# Patient Record
Sex: FEMALE | Race: WHITE | Employment: OTHER | URBAN - METROPOLITAN AREA
[De-identification: names, ages, dates, MRNs, and addresses within clinical notes are randomized per-mention and may not be internally consistent; named-entity substitution may affect disease eponyms.]

---

## 2023-10-25 ENCOUNTER — OFFICE VISIT (OUTPATIENT)
Dept: ORTHOPEDIC SURGERY | Age: 63
End: 2023-10-25

## 2023-10-25 VITALS — HEIGHT: 64 IN | BODY MASS INDEX: 21.85 KG/M2 | WEIGHT: 128 LBS

## 2023-10-25 DIAGNOSIS — M54.2 NECK PAIN: ICD-10-CM

## 2023-10-25 DIAGNOSIS — M65.311 TRIGGER FINGER OF RIGHT THUMB: ICD-10-CM

## 2023-10-25 DIAGNOSIS — G89.29 CHRONIC LEFT SHOULDER PAIN: ICD-10-CM

## 2023-10-25 DIAGNOSIS — M54.12 CERVICAL RADICULOPATHY: Primary | ICD-10-CM

## 2023-10-25 DIAGNOSIS — M25.512 CHRONIC LEFT SHOULDER PAIN: ICD-10-CM

## 2023-10-25 DIAGNOSIS — M50.30 DDD (DEGENERATIVE DISC DISEASE), CERVICAL: ICD-10-CM

## 2023-10-25 DIAGNOSIS — M19.212 SECONDARY OSTEOARTHRITIS, LEFT SHOULDER: ICD-10-CM

## 2023-10-25 RX ORDER — LISINOPRIL 5 MG/1
TABLET ORAL
COMMUNITY
Start: 2023-09-04

## 2023-10-25 RX ORDER — ROSUVASTATIN CALCIUM 5 MG/1
5 TABLET, COATED ORAL EVERY MORNING
COMMUNITY
Start: 2023-09-02

## 2023-10-25 RX ORDER — ALPRAZOLAM 1 MG/1
1 TABLET ORAL 2 TIMES DAILY PRN
COMMUNITY
Start: 2023-10-09

## 2023-10-25 RX ORDER — METHYLPREDNISOLONE 4 MG/1
TABLET ORAL
Qty: 1 KIT | Refills: 0 | Status: SHIPPED | OUTPATIENT
Start: 2023-10-25

## 2023-10-25 RX ORDER — LOVASTATIN 10 MG/1
40 TABLET ORAL EVERY MORNING
COMMUNITY
Start: 2010-06-21

## 2023-10-25 RX ORDER — LEVOTHYROXINE SODIUM 112 MCG
TABLET ORAL
COMMUNITY
Start: 2023-10-15

## 2023-10-25 RX ORDER — VALACYCLOVIR HYDROCHLORIDE 1 G/1
TABLET, FILM COATED ORAL
COMMUNITY
Start: 2023-06-05

## 2023-10-26 NOTE — PROGRESS NOTES
A referral to PT has been ordered of the Cervical spine. A referral to Hand/Elbow specialist has been ordered. A referral to Sports medicine provider was entered.
does not have difficulty with rapid alternating hand movements. Strength testing in the upper extremity reveals the following based on the 5 point grading scale:     Delt(C5) Bicep(C6) WE(C6) Tricep (C7) WF(C7) (C8) Int (T1)   Right 5 5 5 5 5 5 5   Left 5 5 5 5 5 5 5     Pulses are palpable over bilateral radial arteries. Radiographic Studies:     Independent interpretation of AP lateral of the cervical spine reveals Multilevel facet arthropathy of the cervical spine sagittal view shows straightening of the normal lytic curve with slight anterior listhesis C2 4 5 and degenerative disc changes C5-6 and C6-7. X-ray impression: Advanced degenerative changes cervical spine. 3 views of the left shoulder reveal DJD of the left shoulder with prior shoulder trauma able to visualize previous x-rays of the shoulder when she had the hardware removed and does not significantly changed. X-ray impression: Advanced degenerative changes cervical spine next advanced degenerative changes left shoulder with prior history of trauma    Assessment/Plan:      ICD-10-CM    1. Cervical radiculopathy  M54.12 Amb External Referral To Physical Therapy      2. Neck pain  M54.2 XR CERVICAL SPINE (2-3 VIEWS)      3. Chronic left shoulder pain  M25.512 XR SHOULDER LEFT (MIN 2 VIEWS)    G89.29       4. Secondary osteoarthritis, left shoulder  M19.212 Ambulatory referral to Orthopedic Surgery      5. DDD (degenerative disc disease), cervical  M50.30 Amb External Referral To Physical Therapy      6. Trigger finger of right thumb  M65.311            She certainly has degenerative changes of the left shoulder secondary to her previous trauma and surgeries she has decreased range of motion and this is one underlying problem however I also feel she may have some cervical radiculopathy and degenerative changes of the cervical spine are quite prominent.   I would recommend referring her to Dr. Yessy Bhatia for further recommendations for his

## 2023-10-30 ENCOUNTER — OFFICE VISIT (OUTPATIENT)
Dept: ORTHOPEDIC SURGERY | Age: 63
End: 2023-10-30

## 2023-10-30 VITALS — HEIGHT: 64 IN | WEIGHT: 127 LBS | BODY MASS INDEX: 21.68 KG/M2

## 2023-10-30 DIAGNOSIS — M25.531 RIGHT WRIST PAIN: Primary | ICD-10-CM

## 2023-10-30 DIAGNOSIS — M65.311 TRIGGER FINGER OF RIGHT THUMB: ICD-10-CM

## 2023-10-30 RX ORDER — BETAMETHASONE SODIUM PHOSPHATE AND BETAMETHASONE ACETATE 3; 3 MG/ML; MG/ML
6 INJECTION, SUSPENSION INTRA-ARTICULAR; INTRALESIONAL; INTRAMUSCULAR; SOFT TISSUE ONCE
Status: COMPLETED | OUTPATIENT
Start: 2023-10-30 | End: 2023-10-30

## 2023-10-30 RX ADMIN — BETAMETHASONE SODIUM PHOSPHATE AND BETAMETHASONE ACETATE 6 MG: 3; 3 INJECTION, SUSPENSION INTRA-ARTICULAR; INTRALESIONAL; INTRAMUSCULAR; SOFT TISSUE at 10:02

## 2023-10-30 NOTE — PROGRESS NOTES
Orthopaedic Hand Surgery Note    Name: Darrel Stephens  YOB: 1960  Gender: female  MRN: [id number]    CC: New patient referred for hand pain    HPI: Patient is a 66 y.o. female with a chief complaint of right thumb clicking, locking and pain. The symptoms have been going on for 2 months, since lifting a heavy pot. She has recently been placed on a medrol dosepack for her neck, and this seems to have helped. .     ROS/Meds/PSH/PMH/FH/SH: I personally reviewed the patients standard intake form. Pertinents are discussed in the HPI    Physical Examination:  Musculoskeletal:   Examination on the right demonstrates Normal sensation to light touch in the median distribution, normal sensation in ulnar and radial distribution, positive tenderness of the thumb A1 pulley with palpable clicking and positive  locking. The extensor tendons all track well over the MCP joints. No tenderness at the [facility] joint, or first dorsal compartment. Finkelstein's is negative    Imaging / Electrodiagnostic Tests:     Wrist XR: AP, Lateral, Oblique views     Clinical Indication:  1. Right wrist pain    2. Trigger finger of right thumb           Report: AP, lateral, oblique x-ray of the right wrist demonstrates degenerative changes at the thumb IP joint    Impression: as above     Gabi Steen MD         Assessment:     ICD-10-CM    1. Right wrist pain  M25.531 XR WRIST RIGHT (MIN 3 VIEWS)      2. Trigger finger of right thumb  M65.311 betamethasone acetate-betamethasone sodium phosphate (CELESTONE) injection 6 mg     INJECT TENDON SHEATH/LIGAMENT          Plan:  We discussed the diagnosis and different treatment options. We discussed observation, splinting, cortisone injections and surgical release of the A1 pulley.  We discussed that stenosing tenosynovitis at the level of the A1 pulley AKA trigger finger is a chronic condition regardless of how long the symptoms have been present, this most likely has been progressing

## 2023-11-28 ENCOUNTER — OFFICE VISIT (OUTPATIENT)
Dept: ORTHOPEDIC SURGERY | Age: 63
End: 2023-11-28
Payer: COMMERCIAL

## 2023-11-28 VITALS — BODY MASS INDEX: 21.68 KG/M2 | HEIGHT: 64 IN | WEIGHT: 127 LBS

## 2023-11-28 DIAGNOSIS — S42.292P CLOSED 3-PART FRACTURE OF PROXIMAL HUMERUS WITH MALUNION, LEFT: ICD-10-CM

## 2023-11-28 DIAGNOSIS — M25.512 LEFT SHOULDER PAIN, UNSPECIFIED CHRONICITY: Primary | ICD-10-CM

## 2023-11-28 DIAGNOSIS — M19.012 OSTEOARTHRITIS OF LEFT AC (ACROMIOCLAVICULAR) JOINT: ICD-10-CM

## 2023-11-28 PROBLEM — R20.2 PARESTHESIA AND PAIN OF BOTH UPPER EXTREMITIES: Status: ACTIVE | Noted: 2023-08-08

## 2023-11-28 PROBLEM — K76.0 FATTY LIVER: Status: ACTIVE | Noted: 2021-08-17

## 2023-11-28 PROBLEM — M79.601 PARESTHESIA AND PAIN OF BOTH UPPER EXTREMITIES: Status: ACTIVE | Noted: 2023-08-08

## 2023-11-28 PROBLEM — M79.602 PARESTHESIA AND PAIN OF BOTH UPPER EXTREMITIES: Status: ACTIVE | Noted: 2023-08-08

## 2023-11-28 PROCEDURE — 99204 OFFICE O/P NEW MOD 45 MIN: CPT | Performed by: ORTHOPAEDIC SURGERY

## 2023-11-28 PROCEDURE — 20605 DRAIN/INJ JOINT/BURSA W/O US: CPT | Performed by: ORTHOPAEDIC SURGERY

## 2023-11-28 RX ORDER — METHYLPREDNISOLONE ACETATE 40 MG/ML
40 INJECTION, SUSPENSION INTRA-ARTICULAR; INTRALESIONAL; INTRAMUSCULAR; SOFT TISSUE ONCE
Status: COMPLETED | OUTPATIENT
Start: 2023-11-28 | End: 2023-11-28

## 2023-11-28 RX ADMIN — METHYLPREDNISOLONE ACETATE 40 MG: 40 INJECTION, SUSPENSION INTRA-ARTICULAR; INTRALESIONAL; INTRAMUSCULAR; SOFT TISSUE at 10:41

## 2023-11-28 NOTE — PROGRESS NOTES
Name: Sonam Wade  YOB: 1960  Gender: female  MRN: [id number]    CC:   Chief Complaint   Patient presents with    New Patient     Left shoulder    Left shoulder pain    HPI:  This patient presents with a several month history of Left shoulder pain. Patient previously saw Dr. Melina Stapleton and had a left shoulder ORIF in 2010 as well as hardware removal subsequently in 2010. The patient states that she feels pain began in August when she is moving a heavy potted plant the stairs. She notes most the pain is in the trap, neck and lateral shoulder. She has seen [facility] who sent her for physical therapy. She does complain of some numbness and tingling down the arm. She has been doing home exercise program and physical therapy. Denies popping and clicking in the shoulder but does note it in the neck. Allergies   Allergen Reactions    Penicillins Rash     As child       History reviewed. No pertinent past medical history. History reviewed. No pertinent surgical history. History reviewed. No pertinent family history.   Social History     Socioeconomic History    Marital status:      Spouse name: Not on file    Number of children: Not on file    Years of education: Not on file    Highest education level: Not on file   Occupational History    Not on file   Tobacco Use    Smoking status: Never    Smokeless tobacco: Never   Vaping Use    Vaping Use: Never used   Substance and Sexual Activity    Alcohol use: Yes    Drug use: Never    Sexual activity: Not on file   Other Topics Concern    Not on file   Social History Narrative    Not on file     Social Determinants of Health     Financial Resource Strain: Not on file   Food Insecurity: Not on file   Transportation Needs: Not on file   Physical Activity: Not on file   Stress: Not on file   Social Connections: Not on file   Intimate Partner Violence: Not on file   Housing Stability: Not on file              10/25/2023    11:25 AM   AMB PAIN

## 2023-12-07 ENCOUNTER — OFFICE VISIT (OUTPATIENT)
Dept: ORTHOPEDIC SURGERY | Age: 63
End: 2023-12-07
Payer: COMMERCIAL

## 2023-12-07 DIAGNOSIS — M50.30 DDD (DEGENERATIVE DISC DISEASE), CERVICAL: Primary | ICD-10-CM

## 2023-12-07 DIAGNOSIS — M54.12 CERVICAL RADICULOPATHY: ICD-10-CM

## 2023-12-07 PROCEDURE — 99213 OFFICE O/P EST LOW 20 MIN: CPT | Performed by: PHYSICIAN ASSISTANT

## 2023-12-07 NOTE — PROGRESS NOTES
Name: Denia Carlson  YOB: 1960  Gender: female  MRN: [id number]    Chief complaint: Neck Pain (Recheck after PT)       History of present illness: This is a very pleasant 66 y.o. old female who months ago was lifting and carrying a flower pot and felt a pop in the left side of her neck left shoulder even her right thumb. She has had trigger finger of the right thumb. She is also had pain in the left shoulder which has been quite severe has not been able to lift her arm up. She has had neck pain radiating to the top of the left shoulder prickly sensations down her left arm and burning into the left arm. She does have history of a left proximal humerus fracture had surgery with Dr. Dmitriy Brady years ago and then instrumentation was removed. There have not been associated changes in fine motor skills such as handwriting and buttoning buttons. There have not been changes in gait since the onset of the symptoms. The patient has not had cervical surgery in the past.      X-rays of her left shoulder showed prior trauma with mall union and degenerative changes. Cervical spine also showed degenerative disc changes. I felt she had a combination of shoulder pathology and cervical radiculopathy. We referred her to physical therapy. She also had a cortisone injection into the acromioclavicular joint with Dr. Priscilla Cho last week. She reports that significantly helped her shoulder pain. She has had improvement with her neck pain with physical therapy. She continues to do the exercises at home regularly. She has very rare tingling in the left arm on occasion but overall feels much better.              10/25/2023    11:25 AM   AMB PAIN ASSESSMENT   Location of Pain Neck    Location Modifiers Left   Severity of Pain 8   Quality of Pain Aching   Duration of Pain Persistent   Frequency of Pain Constant   Date Pain First Started 8/1/2023   Aggravating Factors Other (Comment)    Limiting Behavior Some

## 2024-01-04 ENCOUNTER — TELEPHONE (OUTPATIENT)
Dept: ORTHOPEDIC SURGERY | Age: 64
End: 2024-01-04

## 2024-01-04 NOTE — TELEPHONE ENCOUNTER
Left VM for pt asking how her shoulder was doing from injection. If doing okay we can cancel her apt and she can have injections every 3 months. And if it didn't help she can keep apt and we can discuss other options.

## 2024-01-09 ENCOUNTER — OFFICE VISIT (OUTPATIENT)
Dept: ORTHOPEDIC SURGERY | Age: 64
End: 2024-01-09
Payer: COMMERCIAL

## 2024-01-09 ENCOUNTER — TELEPHONE (OUTPATIENT)
Dept: ORTHOPEDIC SURGERY | Age: 64
End: 2024-01-09

## 2024-01-09 DIAGNOSIS — S42.292P CLOSED 3-PART FRACTURE OF PROXIMAL HUMERUS WITH MALUNION, LEFT: ICD-10-CM

## 2024-01-09 DIAGNOSIS — M54.2 NECK PAIN: ICD-10-CM

## 2024-01-09 DIAGNOSIS — R20.2 PARESTHESIA OF LEFT ARM: ICD-10-CM

## 2024-01-09 DIAGNOSIS — M19.012 OSTEOARTHRITIS OF LEFT AC (ACROMIOCLAVICULAR) JOINT: ICD-10-CM

## 2024-01-09 DIAGNOSIS — M25.512 LEFT SHOULDER PAIN, UNSPECIFIED CHRONICITY: Primary | ICD-10-CM

## 2024-01-09 PROCEDURE — 99214 OFFICE O/P EST MOD 30 MIN: CPT | Performed by: PHYSICIAN ASSISTANT

## 2024-01-09 RX ORDER — ALPRAZOLAM 0.5 MG/1
0.5 TABLET ORAL 3 TIMES DAILY PRN
Qty: 3 TABLET | Refills: 0 | Status: SHIPPED | OUTPATIENT
Start: 2024-01-09 | End: 2024-01-10

## 2024-01-09 NOTE — TELEPHONE ENCOUNTER
They need confirmation on the Xanax RX. It has two sets of directions on it. Please give her a call.

## 2024-01-09 NOTE — PROGRESS NOTES
Name: China Santos  YOB: 1960  Gender: female  MRN: [id number]    CC:   Chief Complaint   Patient presents with    Follow-up     Left shoulder        HPI: Patient presents for follow-up evaluation of left shoulder pain.  Patient received an AC injection on 11- and notes relief with injection.  Since the patient's last visit she also saw our spine team who decided to treat with observation.  They did see advanced degenerative changes in the cervical spine. Today the patient states she is having worsening numbness and tingling going down the arm.  Notes lateral and superior shoulder pain.  States she is unsure if it is more nerve related versus shoulder related.  Recall: several month history of Left shoulder pain.  Patient previously saw Dr. Caro and had a left shoulder ORIF in 2010 as well as hardware removal subsequently in 2010.  The patient states that she feels pain began in August when she is moving a heavy potted plant the stairs.  She notes most the pain is in the trap, neck and lateral shoulder.  She has seen Tina Emmanuel who sent her for physical therapy.  She does complain of some numbness and tingling down the arm.  She has been doing home exercise program and physical therapy.  Denies popping and clicking in the shoulder but does note it in the neck.   Allergies   Allergen Reactions    Penicillins Rash     As child       History reviewed. No pertinent past medical history.  History reviewed. No pertinent surgical history.  History reviewed. No pertinent family history.  Social History     Socioeconomic History    Marital status:      Spouse name: Not on file    Number of children: Not on file    Years of education: Not on file    Highest education level: Not on file   Occupational History    Not on file   Tobacco Use    Smoking status: Never    Smokeless tobacco: Never   Vaping Use    Vaping Use: Never used   Substance and Sexual Activity    Alcohol use: Yes    Drug

## 2024-01-24 ENCOUNTER — OFFICE VISIT (OUTPATIENT)
Dept: ORTHOPEDIC SURGERY | Age: 64
End: 2024-01-24
Payer: COMMERCIAL

## 2024-01-24 DIAGNOSIS — M50.30 DDD (DEGENERATIVE DISC DISEASE), CERVICAL: ICD-10-CM

## 2024-01-24 DIAGNOSIS — M48.02 FORAMINAL STENOSIS OF CERVICAL REGION: ICD-10-CM

## 2024-01-24 DIAGNOSIS — M54.2 CERVICALGIA: Primary | ICD-10-CM

## 2024-01-24 DIAGNOSIS — M48.02 CERVICAL SPINAL STENOSIS: ICD-10-CM

## 2024-01-24 PROCEDURE — 99214 OFFICE O/P EST MOD 30 MIN: CPT | Performed by: PHYSICIAN ASSISTANT

## 2024-01-24 NOTE — PROGRESS NOTES
Name: China Santos  YOB: 1960  Gender: female  MRN: [id number]    Chief complaint: Follow-up (MRI Results)       History of present illness:    This is a very pleasant 63 y.o. old female who months ago was lifting and carrying a flower pot and felt a pop in the left side of her neck left shoulder even her right thumb.  She has had trigger finger of the right thumb.  She is also had pain in the left shoulder which has been quite severe has not been able to lift her arm up.  She has had neck pain radiating to the top of the left shoulder prickly sensations down her left arm and burning into the left arm.  She does have history of a left proximal humerus fracture had surgery with Dr. Caro years ago and then instrumentation was removed.    There have not been associated changes in fine motor skills such as handwriting and buttoning buttons. There have not been changes in gait since the onset of the symptoms. The patient has not had cervical surgery in the past.      X-rays of her left shoulder showed prior trauma with malunion and degenerative changes.  Cervical spine also showed degenerative disc changes.  I felt she had a combination of shoulder pathology and cervical radiculopathy.  We referred her to physical therapy.  She also had a cortisone injection into the acromioclavicular joint with Dr. Smallwood which helped shoulder pain.  She has had improvement with her neck pain with physical therapy.  She continues to do the exercises at home regularly.  She has very rare tingling in the left arm on occasion but overall feels much better.    I felt she also had cervical radicular symptoms.              10/25/2023    11:25 AM   AMB PAIN ASSESSMENT   Location of Pain Neck    Location Modifiers Left   Severity of Pain 8   Quality of Pain Aching   Duration of Pain Persistent   Frequency of Pain Constant   Date Pain First Started 8/1/2023   Aggravating Factors Other (Comment)    Limiting Behavior

## 2024-01-24 NOTE — PATIENT INSTRUCTIONS
Patient Education        Learning About a Cervical Epidural Injection  What is a cervical epidural steroid injection?     A cervical epidural steroid injection is a shot of medicine into the area around the spinal cord in your neck. You may get it to help with pain, tingling, or numbness in your neck, shoulder, or arm. It may have a steroid to reduce swelling and pain and a local anesthetic to numb the nerves.  How is a cervical epidural steroid injection done?  The doctor will use a tiny needle to numb the skin where you are getting the injection.  After the skin is numb, your doctor will use a larger needle for the epidural injection. X-ray or ultrasound may be used to help guide the needle. You may feel some pressure. But you should not feel pain.  How long does an epidural steroid injection take?  The procedure will take 5 to 15 minutes. You will go home about an hour later.  What can you expect after a cervical epidural steroid injection?  If your injection included local anesthetic medicine, your neck, shoulder, arm, or hand may feel heavy or numb right after the shot.  With a local anesthetic, your pain may be gone right away. But it may return after a few hours. This is because the steroid hasn't started working yet. Before the steroid starts to work, your neck, shoulder, or arm may be sore for a few days.  These injections don't always work. When they do, it takes 1 to 5 days. The pain relief can last for several days to a few months or longer.  Some people are dizzy or feel sick to their stomach after getting this shot. These symptoms usually don't last very long.  You may want to do less than normal for a few days. But you may also be able to return to your daily routine.  If your pain is better, you may be able to keep doing your normal activities or physical therapy. But try not to overdo it, even if your pain has improved a lot. If your pain is only a little better or if it comes back, your doctor may

## 2024-03-05 ENCOUNTER — OFFICE VISIT (OUTPATIENT)
Dept: ORTHOPEDIC SURGERY | Age: 64
End: 2024-03-05
Payer: COMMERCIAL

## 2024-03-05 VITALS — BODY MASS INDEX: 21.68 KG/M2 | HEIGHT: 64 IN | WEIGHT: 127 LBS

## 2024-03-05 DIAGNOSIS — M65.311 TRIGGER FINGER OF RIGHT THUMB: Primary | ICD-10-CM

## 2024-03-05 PROCEDURE — 20550 NJX 1 TENDON SHEATH/LIGAMENT: CPT | Performed by: ORTHOPAEDIC SURGERY

## 2024-03-05 RX ORDER — BETAMETHASONE SODIUM PHOSPHATE AND BETAMETHASONE ACETATE 3; 3 MG/ML; MG/ML
6 INJECTION, SUSPENSION INTRA-ARTICULAR; INTRALESIONAL; INTRAMUSCULAR; SOFT TISSUE ONCE
Status: COMPLETED | OUTPATIENT
Start: 2024-03-05 | End: 2024-03-05

## 2024-03-05 RX ADMIN — BETAMETHASONE SODIUM PHOSPHATE AND BETAMETHASONE ACETATE 6 MG: 3; 3 INJECTION, SUSPENSION INTRA-ARTICULAR; INTRALESIONAL; INTRAMUSCULAR; SOFT TISSUE at 09:44

## 2024-03-05 NOTE — PROGRESS NOTES
name and birthdate. The injection site was identified, marked and prepped with a alcohol swab. Time out completed. The A1 pulley of the right thumb finger was/were injected with a 25 gauge needle with 1ml of 6mg/ml Betamethasone and 1ml xylocaine plain 1%. The injection site was then dressed with a bandaid. The patient tolerated the injection well. The patient was instructed to monitor their blood sugars if diabetic and call if any concerns. The patient was instructed to call the office if any adverse local effects occurred or any if any questions or concerns arise.       Patient voiced accordance and understanding of the information provided and the formulated plan. All questions were answered to the patient's satisfaction during the encounter.        Jessica Huerta MD  Orthopaedic Surgery  03/05/24  9:41 AM

## 2024-03-27 ENCOUNTER — OFFICE VISIT (OUTPATIENT)
Dept: ORTHOPEDIC SURGERY | Age: 64
End: 2024-03-27
Payer: COMMERCIAL

## 2024-03-27 DIAGNOSIS — M48.02 CERVICAL SPINAL STENOSIS: ICD-10-CM

## 2024-03-27 DIAGNOSIS — M48.02 FORAMINAL STENOSIS OF CERVICAL REGION: ICD-10-CM

## 2024-03-27 DIAGNOSIS — M50.30 DDD (DEGENERATIVE DISC DISEASE), CERVICAL: Primary | ICD-10-CM

## 2024-03-27 PROCEDURE — 99214 OFFICE O/P EST MOD 30 MIN: CPT | Performed by: PHYSICIAN ASSISTANT

## 2024-03-27 NOTE — PROGRESS NOTES
spurring. Moderate  canal narrowing. Severe left foraminal narrowing. Mild flattening of the  cervical cord.      C5-6: Disc osteophyte complex formation. Moderate canal narrowing. Moderate  bilateral foraminal narrowing. Flattening of the cervical cord.      C6-7:  Disc desiccation. Mild posterior disc protrusion. Severe right foraminal  narrowing.        C7-T1: Disc desiccation and severe right foraminal narrowing.    Impression  Severe multilevel degenerative changes of the cervical spine seen  most significantly at C4/C5.    I have independently reviewedThe MRI of the cervical spine.  She does have degenerative changes C4-5, C5-6 and C6-7 with disc bulging.  There is mild left foraminal narrowing C3-4.  There is severe left foraminal narrowing C4-5 and severe right foraminal narrowing C5-6.  No significant central stenosis.  Cord signal is normal.    Assessment/Plan:      ICD-10-CM    1. DDD (degenerative disc disease), cervical  M50.30       2. Foraminal stenosis of cervical region  M48.02       3. Cervical spinal stenosis  M48.02          She has improvement of her cervical radicular symptoms with FAINA although not resolved. She would like to avoid surgery. I recommend trying another FAINA to see if her numnbess and tingling continue to improve. She does not have weakness and pain is better. We also discussed surgical options.      -Injections:  The patient will be referred to a colleague who specializes in cervical epidural and or facet injections. We will reassess for potential surgical options if there is no long lasting benefit from the injections.  -Future surgery: If the patient fails the conservative options listed above, I believe they may be a candidate for a likely C4-5 ACDF, possibly C5-6 as well.        No orders of the defined types were placed in this encounter.       No orders of the defined types were placed in this encounter.       4 This is a chronic illness/condition with exacerbation and

## 2024-05-16 ENCOUNTER — OFFICE VISIT (OUTPATIENT)
Age: 64
End: 2024-05-16
Payer: COMMERCIAL

## 2024-05-16 DIAGNOSIS — M48.02 CERVICAL SPINAL STENOSIS: ICD-10-CM

## 2024-05-16 DIAGNOSIS — M48.02 FORAMINAL STENOSIS OF CERVICAL REGION: Primary | ICD-10-CM

## 2024-05-16 DIAGNOSIS — M50.30 DDD (DEGENERATIVE DISC DISEASE), CERVICAL: ICD-10-CM

## 2024-05-16 DIAGNOSIS — M19.012 OSTEOARTHRITIS OF LEFT AC (ACROMIOCLAVICULAR) JOINT: ICD-10-CM

## 2024-05-16 DIAGNOSIS — M25.512 LEFT SHOULDER PAIN, UNSPECIFIED CHRONICITY: ICD-10-CM

## 2024-05-16 DIAGNOSIS — S42.292P CLOSED 3-PART FRACTURE OF PROXIMAL HUMERUS WITH MALUNION, LEFT: ICD-10-CM

## 2024-05-16 DIAGNOSIS — M54.12 CERVICAL RADICULOPATHY: ICD-10-CM

## 2024-05-16 PROCEDURE — 99214 OFFICE O/P EST MOD 30 MIN: CPT | Performed by: PHYSICIAN ASSISTANT

## 2024-05-16 RX ORDER — CELECOXIB 200 MG/1
200 CAPSULE ORAL DAILY
Qty: 30 CAPSULE | Refills: 0 | Status: SHIPPED | OUTPATIENT
Start: 2024-05-16 | End: 2024-06-15

## 2024-05-16 RX ORDER — GABAPENTIN 100 MG/1
100 CAPSULE ORAL 3 TIMES DAILY
Qty: 90 CAPSULE | Refills: 0 | Status: SHIPPED | OUTPATIENT
Start: 2024-05-16 | End: 2024-06-15

## 2024-05-16 NOTE — PROGRESS NOTES
A referral to PT has been ordered of the Cervical spine. A referral to pain management has been ordered. [facility] A referral to surgical spine has been placed.

## 2024-05-16 NOTE — PROGRESS NOTES
Name: China Santos  YOB: 1960  Gender: female  MRN: [id number]    Chief complaint: Neck Pain (Follow up after Cervical injection)       History of present illness:    This is a very pleasant 64 y.o. old female who August 2023 was lifting and carrying a flower pot and felt a pop in the left side of her neck left shoulder even her right thumb.  She has had trigger finger of the right thumb which recently was injected by Dr. Huerta.  She also had pain in the left shoulder and was not able to lift her arm up.  She has had neck pain radiating to the top of the left shoulder prickly sensations down her left arm and burning into the left arm.  She does have history of a left proximal humerus fracture had ORIF with a malunion.    X-rays of her left shoulder showed prior trauma with malunion and degenerative changes.  Cervical spine also showed degenerative disc changes.  I felt she had a combination of shoulder pathology and cervical radiculopathy.  We referred her to physical therapy.  She also had a cortisone injection into the acromioclavicular joint with Dr. Smallwood which helped shoulder pain.      MRI of the cervical spine revealed degenerative changes C4-5, C5-6 and C6-7 with disc bulging.  There is mild left foraminal narrowing C3-4.  There is severe left foraminal narrowing C4-5 and severe right foraminal narrowing C5-6.  No significant central stenosis.  Cord signal is normal.   We felt she had cervical radicular symptoms and recommended cervical MONIK. She had FAINA 2/28/24 and reports the MONIK was very helpful with her radicular symptoms in the left arm.. She still had some shoulder pain and tingling in her left arm.   She had a repeat cervical MONIK with Dr. Hou and she has had resolution of paresthesias and tingling in her left arm.  Since that injection however she is having worsening burning pain over the left shoulder and deltoid.  Pain is now 8 out of 10.  She has not followed up with

## 2024-05-31 ENCOUNTER — EVALUATION (OUTPATIENT)
Age: 64
End: 2024-05-31

## 2024-05-31 DIAGNOSIS — M54.2 NECK PAIN ON LEFT SIDE: ICD-10-CM

## 2024-05-31 DIAGNOSIS — M54.12 CERVICAL RADICULOPATHY: ICD-10-CM

## 2024-05-31 DIAGNOSIS — M50.30 DDD (DEGENERATIVE DISC DISEASE), CERVICAL: ICD-10-CM

## 2024-05-31 DIAGNOSIS — M19.012 OSTEOARTHRITIS OF LEFT AC (ACROMIOCLAVICULAR) JOINT: ICD-10-CM

## 2024-05-31 DIAGNOSIS — M48.02 FORAMINAL STENOSIS OF CERVICAL REGION: ICD-10-CM

## 2024-05-31 DIAGNOSIS — R68.89 DECREASED ACTIVITY TOLERANCE: ICD-10-CM

## 2024-05-31 DIAGNOSIS — S42.292P CLOSED 3-PART FRACTURE OF PROXIMAL HUMERUS WITH MALUNION, LEFT: ICD-10-CM

## 2024-05-31 DIAGNOSIS — M48.02 CERVICAL SPINAL STENOSIS: ICD-10-CM

## 2024-05-31 DIAGNOSIS — M25.612 SHOULDER STIFFNESS, LEFT: Primary | ICD-10-CM

## 2024-05-31 DIAGNOSIS — M25.512 CHRONIC LEFT SHOULDER PAIN: ICD-10-CM

## 2024-05-31 DIAGNOSIS — G89.29 CHRONIC LEFT SHOULDER PAIN: ICD-10-CM

## 2024-05-31 DIAGNOSIS — M25.512 LEFT SHOULDER PAIN, UNSPECIFIED CHRONICITY: ICD-10-CM

## 2024-05-31 DIAGNOSIS — Z78.9 DECREASED ACTIVITIES OF DAILY LIVING (ADL): ICD-10-CM

## 2024-05-31 DIAGNOSIS — M62.81 MUSCLE WEAKNESS (GENERALIZED): ICD-10-CM

## 2024-05-31 NOTE — PROGRESS NOTES
improving pain levels and muscle activation.    Long term goals to be met by 8/29/2024 (90 days):  Pt will be independent and compliant with a comprehensive home program and activity progression.  Pt will achieve cervical AROM to WFL to improve safety when driving, comfort while reading, and sleep.  Pt will demo MMT >/= 4/5 in C paraspinals to improve ADL performance.  Pt will be able to sleep without limitation due to neck pain.  Pt will return to previous level of function without complaint of neck pain.    [employer]

## 2024-06-05 ENCOUNTER — TREATMENT (OUTPATIENT)
Age: 64
End: 2024-06-05
Payer: COMMERCIAL

## 2024-06-05 DIAGNOSIS — M54.2 NECK PAIN ON LEFT SIDE: ICD-10-CM

## 2024-06-05 DIAGNOSIS — Z78.9 DECREASED ACTIVITIES OF DAILY LIVING (ADL): ICD-10-CM

## 2024-06-05 DIAGNOSIS — M25.512 CHRONIC LEFT SHOULDER PAIN: ICD-10-CM

## 2024-06-05 DIAGNOSIS — R68.89 DECREASED ACTIVITY TOLERANCE: ICD-10-CM

## 2024-06-05 DIAGNOSIS — G89.29 CHRONIC LEFT SHOULDER PAIN: ICD-10-CM

## 2024-06-05 DIAGNOSIS — M54.12 CERVICAL RADICULOPATHY: ICD-10-CM

## 2024-06-05 DIAGNOSIS — M48.02 CERVICAL SPINAL STENOSIS: ICD-10-CM

## 2024-06-05 DIAGNOSIS — M25.612 SHOULDER STIFFNESS, LEFT: Primary | ICD-10-CM

## 2024-06-05 PROCEDURE — 97110 THERAPEUTIC EXERCISES: CPT | Performed by: PHYSICAL THERAPIST

## 2024-06-05 PROCEDURE — 97012 MECHANICAL TRACTION THERAPY: CPT | Performed by: PHYSICAL THERAPIST

## 2024-06-05 PROCEDURE — 97140 MANUAL THERAPY 1/> REGIONS: CPT | Performed by: PHYSICAL THERAPIST

## 2024-06-05 NOTE — PROGRESS NOTES
[facility]  [address] [place] [id number]  Dept: [phone]      Physical Therapy Daily Note     Referring MD: Tina Emmanuel PA-C  Diagnosis:   No diagnosis found.     Surgery: L shoulder surgery    Therapy precautions: X-rays of her left shoulder showed prior trauma with malunion and degenerative changes.   Co-morbidities affecting plan of care: none    Payor: Payor: [employer] /  /  /  Billing pattern: Commercial- substantial/midpoint time each CPT  Total Timed Procedure Codes: 45 min, Total Time: 60 min Modifier needed: No  Episode visit count:  2     Diagnostic exams (per chart review): MRI of the cervical spine revealed degenerative changes C4-5, C5-6 and C6-7 with disc bulging.  There is mild left foraminal narrowing C3-4.  There is severe left foraminal narrowing C4-5 and severe right foraminal narrowing C5-6.  No significant central stenosis.  Cord signal is normal.     MEDICATION: reviewed in chart  SUBJECTIVE     Reports sleep was as bad for at least 2 nights.     OBJECTIVE   Patient with improved tolerance to manual therapy. Pain was centralized with SB R, Rotation R.     Treatment provided today consisted of:     Therapeutic exercise (58034) x 10 min to address ROM/strength deficits and to develop an initial HEP as noted below.    Education on posture and symptom management  Head press in supine in flexion SB L, rotation L  Standing overhead stretches  Supine scap retractions and protractions with UE in front x 10   Supine horizontal abd/add with slight head press the whole movement     Manual therapy (88659) x 30 min utilizing techniques to improve joint and/or soft tissue mobility, ROM, and function as well as helping to decrease pain/spasms and swelling.  Palpation and assessment of soft tissue, muscles, and landmarks   Grade 1 mob to mid C's into flexion SB  SCS technique to PC 2-5, inion  STM to TRP, levators    Intermittent mechanical traction (27212) x 15 min

## 2024-06-10 ENCOUNTER — OFFICE VISIT (OUTPATIENT)
Age: 64
End: 2024-06-10
Payer: COMMERCIAL

## 2024-06-10 DIAGNOSIS — M54.12 CERVICAL RADICULOPATHY: ICD-10-CM

## 2024-06-10 DIAGNOSIS — M48.02 CERVICAL SPINAL STENOSIS: Primary | ICD-10-CM

## 2024-06-10 PROCEDURE — 99214 OFFICE O/P EST MOD 30 MIN: CPT | Performed by: ORTHOPAEDIC SURGERY

## 2024-06-10 RX ORDER — GABAPENTIN 100 MG/1
100 CAPSULE ORAL 3 TIMES DAILY
Qty: 90 CAPSULE | Refills: 0 | Status: SHIPPED | OUTPATIENT
Start: 2024-06-10 | End: 2024-07-10

## 2024-06-10 NOTE — PROGRESS NOTES
Name: China Santos  YOB: 1960  Gender: female  MRN: [id number]  Age: 64 y.o.    Chief Complaint: Neck and radiating upper extremity pain.    History of present illness:    This is a very pleasant 64 y.o. female who has a remote history of fracture of the left humeral head.  She has always had some stiffness of the left shoulder.  However, her left shoulder pain became much worse after an incident carrying a flowerpot last summer.  The pain radiated from her neck and into the left shoulder even down to the thumb.  She developed a trigger thumb on the right side which was injected by Dr. Huerta.  She had significant weakness and difficulty raising her left arm at the deltoid.  She had burning sensations over the deltoid to the mid biceps area.  She had significant neck pain with turning her head to the left side or looking overhead.  She did see Dr. Smallwood for the shoulder who recommended physical therapy and performed an injection in the shoulder but he ultimately felt that the symptoms were more neurologic.  An MRI of the cervical spine was obtained.  She had a cervical epidural steroid injection which helped transiently with her radicular symptoms.  Her pain is quite nagging and interferes with activities of daily living including housework, gardening, travel, sleeping.  She has pain even at sitting rest.            Medications:   Current Outpatient Medications   Medication Sig    celecoxib (CELEBREX) 200 MG capsule Take 1 capsule by mouth daily    gabapentin (NEURONTIN) 100 MG capsule Take 1 capsule by mouth 3 times daily for 30 days. Take 1 tablet by mouth at night for 3 nights, then increase to BID morning and night for 3 days, you may taper up to three times a day.    ALPRAZolam (XANAX) 1 MG tablet Take 1 tablet by mouth 2 times daily as needed.    SYNTHROID 112 MCG tablet     lisinopril (PRINIVIL;ZESTRIL) 5 MG tablet TAKE 1 TABLET BY MOUTH IN THE MORNING AND 1 IN THE EVENING

## 2024-06-11 ENCOUNTER — PREP FOR PROCEDURE (OUTPATIENT)
Dept: ORTHOPEDIC SURGERY | Age: 64
End: 2024-06-11

## 2024-06-11 DIAGNOSIS — M54.12 CERVICAL RADICULOPATHY: ICD-10-CM

## 2024-06-11 DIAGNOSIS — M48.02 CERVICAL SPINAL STENOSIS: Primary | ICD-10-CM

## 2024-06-11 DIAGNOSIS — M40.202 KYPHOSIS OF CERVICAL REGION, UNSPECIFIED KYPHOSIS TYPE: ICD-10-CM

## 2024-06-11 RX ORDER — ACETAMINOPHEN 325 MG/1
1000 TABLET ORAL ONCE
Status: CANCELLED | OUTPATIENT
Start: 2024-06-11 | End: 2024-06-11

## 2024-06-18 ENCOUNTER — HOSPITAL ENCOUNTER (OUTPATIENT)
Dept: SURGERY | Age: 64
Discharge: HOME | End: 2024-06-21
Payer: COMMERCIAL

## 2024-06-18 VITALS
WEIGHT: 124.8 LBS | SYSTOLIC BLOOD PRESSURE: 127 MMHG | BODY MASS INDEX: 22.97 KG/M2 | TEMPERATURE: 97.1 F | HEART RATE: 94 BPM | HEIGHT: 62 IN | RESPIRATION RATE: 18 BRPM | OXYGEN SATURATION: 96 % | DIASTOLIC BLOOD PRESSURE: 68 MMHG

## 2024-06-18 LAB
ANION GAP SERPL CALC-SCNC: 15 MMOL/L (ref 9–18)
APPEARANCE UR: CLEAR
BACTERIA URNS QL MICRO: NEGATIVE /HPF
BASOPHILS # BLD: 0.1 K/UL (ref 0–0.2)
BASOPHILS NFR BLD: 1 % (ref 0–2)
BILIRUB UR QL: NEGATIVE
BUN SERPL-MCNC: 13 MG/DL (ref 8–23)
CALCIUM SERPL-MCNC: 10.1 MG/DL (ref 8.8–10.2)
CASTS URNS QL MICRO: ABNORMAL /LPF
CHLORIDE SERPL-SCNC: 101 MMOL/L (ref 98–107)
CO2 SERPL-SCNC: 23 MMOL/L (ref 20–28)
COLOR UR: ABNORMAL
CREAT SERPL-MCNC: 0.42 MG/DL (ref 0.6–1.1)
DIFFERENTIAL METHOD BLD: ABNORMAL
EOSINOPHIL # BLD: 0.1 K/UL (ref 0–0.8)
EOSINOPHIL NFR BLD: 0 % (ref 0.5–7.8)
EPI CELLS #/AREA URNS HPF: ABNORMAL /HPF
ERYTHROCYTE [DISTWIDTH] IN BLOOD BY AUTOMATED COUNT: 12.1 % (ref 11.9–14.6)
GLUCOSE SERPL-MCNC: 100 MG/DL (ref 70–99)
GLUCOSE UR STRIP.AUTO-MCNC: NEGATIVE MG/DL
HCT VFR BLD AUTO: 41.8 % (ref 35.8–46.3)
HGB BLD-MCNC: 13.7 G/DL (ref 11.7–15.4)
HGB UR QL STRIP: NEGATIVE
HYALINE CASTS URNS QL MICRO: ABNORMAL /LPF
IMM GRANULOCYTES # BLD AUTO: 0.1 K/UL (ref 0–0.5)
IMM GRANULOCYTES NFR BLD AUTO: 0 % (ref 0–5)
KETONES UR QL STRIP.AUTO: NEGATIVE MG/DL
LEUKOCYTE ESTERASE UR QL STRIP.AUTO: ABNORMAL
LYMPHOCYTES # BLD: 2.7 K/UL (ref 0.5–4.6)
LYMPHOCYTES NFR BLD: 24 % (ref 13–44)
MCH RBC QN AUTO: 31.8 PG (ref 26.1–32.9)
MCHC RBC AUTO-ENTMCNC: 32.8 G/DL (ref 31.4–35)
MCV RBC AUTO: 97 FL (ref 82–102)
MONOCYTES # BLD: 1 K/UL (ref 0.1–1.3)
MONOCYTES NFR BLD: 9 % (ref 4–12)
NEUTS SEG # BLD: 7.3 K/UL (ref 1.7–8.2)
NEUTS SEG NFR BLD: 66 % (ref 43–78)
NITRITE UR QL STRIP.AUTO: NEGATIVE
NRBC # BLD: 0 K/UL (ref 0–0.2)
PH UR STRIP: 7.5 (ref 5–9)
PLATELET # BLD AUTO: 326 K/UL (ref 150–450)
PMV BLD AUTO: 10 FL (ref 9.4–12.3)
POTASSIUM SERPL-SCNC: 4.1 MMOL/L (ref 3.5–5.1)
PROT UR STRIP-MCNC: NEGATIVE MG/DL
RBC # BLD AUTO: 4.31 M/UL (ref 4.05–5.2)
RBC #/AREA URNS HPF: ABNORMAL /HPF
SODIUM SERPL-SCNC: 139 MMOL/L (ref 136–145)
SP GR UR REFRACTOMETRY: 1 (ref 1–1.02)
UROBILINOGEN UR QL STRIP.AUTO: 0.2 EU/DL (ref 0.2–1)
WBC # BLD AUTO: 11.2 K/UL (ref 4.3–11.1)
WBC URNS QL MICRO: ABNORMAL /HPF

## 2024-06-18 PROCEDURE — 80048 BASIC METABOLIC PNL TOTAL CA: CPT

## 2024-06-18 PROCEDURE — 81001 URINALYSIS AUTO W/SCOPE: CPT

## 2024-06-18 PROCEDURE — 87641 MR-STAPH DNA AMP PROBE: CPT

## 2024-06-18 PROCEDURE — 85025 COMPLETE CBC W/AUTO DIFF WBC: CPT

## 2024-06-18 RX ORDER — VITAMIN B COMPLEX
1 CAPSULE ORAL DAILY
COMMUNITY

## 2024-06-18 RX ORDER — ANTIOX #8/OM3/DHA/EPA/LUT/ZEAX 250-2.5 MG
1 CAPSULE ORAL 2 TIMES DAILY
COMMUNITY

## 2024-06-18 ASSESSMENT — PAIN DESCRIPTION - LOCATION: LOCATION: BACK

## 2024-06-18 ASSESSMENT — PAIN DESCRIPTION - PAIN TYPE: TYPE: CHRONIC PAIN

## 2024-06-18 ASSESSMENT — PAIN DESCRIPTION - FREQUENCY: FREQUENCY: INTERMITTENT

## 2024-06-18 ASSESSMENT — PAIN DESCRIPTION - DESCRIPTORS: DESCRIPTORS: ACHING;SHARP;BURNING

## 2024-06-18 ASSESSMENT — PAIN DESCRIPTION - DIRECTION: RADIATING_TOWARDS: SHOULDER

## 2024-06-18 NOTE — PRE-PROCEDURE INSTRUCTIONS
Patient verified name, , and surgery as listed in [facility]. Patient provided medical/health information and PTA medications to the best of their ability.    TYPE  CASE: 2  Orders per surgeon: were received per protocol  Labs per surgeon: cbc w/ diff, bmp ua and mrsa collected and results in EPIC  Labs per anesthesia protocol: no additional  EKG:  not required per protocol. Patient denies any hx of CAD     MRSA/MSSA swab collected; pharmacy to review and dose antibiotic as appropriate.     Patient provided with and instructed on education handouts including Guide to Surgery, blood transfusions, pain management, and hand hygiene for the family and community, and [facility] brochure.     Road to Recovery Spine surgery patient guide given.   Samia-hex and instructions given per hospital policy.    Original medication prescription bottles not visualized during patient appointment.     Patient teach back successful and patient demonstrates knowledge of instruction.

## 2024-06-18 NOTE — PRE-PROCEDURE INSTRUCTIONS
PLEASE CONTINUE TAKING ALL PRESCRIPTION MEDICATIONS UP TO THE DAY OF SURGERY UNLESS OTHERWISE DIRECTED BELOW. You may take Tylenol, allergy,  and/or indigestion medications.     TAKE ONLY THESE MEDICATIONS ON THE DAY OF SURGERY    GABAPENTIN   XANAX IF NEEDED   SYNTHROID     DISCONTINUE all vitamins and supplements 7 days prior to surgery. DISCONTINUE Non-Steroidal Anti-Inflammatory (NSAIDS) such as Advil and Aleve 5 days prior to surgery.     Home Medications to Hold- please continue all other medications except these.    HOLD LISINOPRIL MORNING OF SURGERY   HOLD ALL VITAMINS AND SUPPLEMENTS 7 DAYS PRIOR TO SURGERY     Comments      On the day before surgery please take 2 Tylenol in the morning and then again before bed. You may use either regular or extra strength. 06/25/2024          Please do not bring home medications with you on the day of surgery unless otherwise directed by your nurse.  If you are instructed to bring home medications, please give them to your nurse as they will be administered by the nursing staff.    If you have any questions, please call [facility] [phone].    A copy of this note was provided to the patient for reference.

## 2024-06-18 NOTE — PROGRESS NOTES
Pre-Assessment Surgery Review      Patient ID:  China Santos  [id number]  64 y.o.  1960  Surgeon: Dr Rosa  Date of Surgery: 6/26/2024  Procedure:  C4-6 anterior cervical discectomy and fusion    Primary Care Physician: Maritza Shine [phone]  Specialty Physician(s):      Subjective:   China Santos is a 64 y.o. White (non-) female who presents for preoperative evaluation. This is a preoperative chart review note based on data collected by the nurse at the surgical Pre-Assessment visit.    Past Medical History:   Diagnosis Date    Hyperlipidemia     Hypertension     Thyroid disease       Past Surgical History:   Procedure Laterality Date    CHOLECYSTECTOMY, LAPAROSCOPIC  2011    HYSTERECTOMY (CERVIX STATUS UNKNOWN)  2011    Endometeriosis    ORIF HUMERUS FRACTURE  2010    with rai inserted     No family history on file.   Social History     Tobacco Use    Smoking status: Never    Smokeless tobacco: Never   Substance Use Topics    Alcohol use: Yes       Prior to Admission medications    Medication Sig Start Date End Date Taking? Authorizing Provider   b complex vitamins capsule Take 1 capsule by mouth daily   Yes Parvin Swanson MD   Omega-3 Fatty Acids (FISH OIL) 1200 MG CPDR Take 2 capsules by mouth Daily   Yes Parvin Swanson MD   Multiple Vitamins-Minerals (PRESERVISION AREDS 2) CAPS Take 1 capsule by mouth in the morning and at bedtime   Yes Parvin Swanson MD   gabapentin (NEURONTIN) 100 MG capsule Take 1 capsule by mouth 3 times daily for 30 days. Intended supply: 30 days 6/10/24 7/10/24  Danny Rosa MD   ALPRAZolam (XANAX) 1 MG tablet Take 1 tablet by mouth 2 times daily as needed. 10/9/23   Parvin Swanson MD   SYNTHROID 112 MCG tablet  10/15/23   Parvin Swanson MD   lisinopril (PRINIVIL;ZESTRIL) 5 MG tablet Take 1 tablet by mouth daily May take an additional dose if BP rises 9/4/23   Parvin Swanson MD   mupirocin

## 2024-06-19 LAB
MRSA DNA SPEC QL NAA+PROBE: NOT DETECTED
S AUREUS CPE NOSE QL NAA+PROBE: NOT DETECTED

## 2024-06-25 ENCOUNTER — ANESTHESIA EVENT (OUTPATIENT)
Dept: SURGERY | Age: 64
End: 2024-06-25
Payer: COMMERCIAL

## 2024-06-26 ENCOUNTER — APPOINTMENT (OUTPATIENT)
Dept: GENERAL RADIOLOGY | Age: 64
End: 2024-06-26
Attending: ORTHOPAEDIC SURGERY
Payer: COMMERCIAL

## 2024-06-26 ENCOUNTER — HOSPITAL ENCOUNTER (OUTPATIENT)
Age: 64
Setting detail: OUTPATIENT SURGERY
Discharge: HOME | End: 2024-06-26
Attending: ORTHOPAEDIC SURGERY | Admitting: ORTHOPAEDIC SURGERY
Payer: COMMERCIAL

## 2024-06-26 ENCOUNTER — ANESTHESIA (OUTPATIENT)
Dept: SURGERY | Age: 64
End: 2024-06-26
Payer: COMMERCIAL

## 2024-06-26 VITALS
DIASTOLIC BLOOD PRESSURE: 92 MMHG | SYSTOLIC BLOOD PRESSURE: 155 MMHG | RESPIRATION RATE: 17 BRPM | BODY MASS INDEX: 22.68 KG/M2 | OXYGEN SATURATION: 93 % | WEIGHT: 124 LBS | TEMPERATURE: 98.2 F | HEART RATE: 70 BPM

## 2024-06-26 DIAGNOSIS — M54.12 CERVICAL RADICULOPATHY: Primary | ICD-10-CM

## 2024-06-26 LAB
ABO + RH BLD: NORMAL
BLOOD GROUP ANTIBODIES SERPL: NORMAL
SPECIMEN EXP DATE BLD: NORMAL

## 2024-06-26 PROCEDURE — 6360000002 HC RX W HCPCS: Performed by: ORTHOPAEDIC SURGERY

## 2024-06-26 PROCEDURE — 2500000003 HC RX 250 WO HCPCS

## 2024-06-26 PROCEDURE — 2709999900 HC NON-CHARGEABLE SUPPLY: Performed by: ORTHOPAEDIC SURGERY

## 2024-06-26 PROCEDURE — 3600000004 HC SURGERY LEVEL 4 BASE: Performed by: ORTHOPAEDIC SURGERY

## 2024-06-26 PROCEDURE — 2580000003 HC RX 258: Performed by: ANESTHESIOLOGY

## 2024-06-26 PROCEDURE — 3600000014 HC SURGERY LEVEL 4 ADDTL 15MIN: Performed by: ORTHOPAEDIC SURGERY

## 2024-06-26 PROCEDURE — 7100000010 HC PHASE II RECOVERY - FIRST 15 MIN: Performed by: ORTHOPAEDIC SURGERY

## 2024-06-26 PROCEDURE — 7100000011 HC PHASE II RECOVERY - ADDTL 15 MIN: Performed by: ORTHOPAEDIC SURGERY

## 2024-06-26 PROCEDURE — C1889 IMPLANT/INSERT DEVICE, NOC: HCPCS | Performed by: ORTHOPAEDIC SURGERY

## 2024-06-26 PROCEDURE — 7100000001 HC PACU RECOVERY - ADDTL 15 MIN: Performed by: ORTHOPAEDIC SURGERY

## 2024-06-26 PROCEDURE — 6370000000 HC RX 637 (ALT 250 FOR IP): Performed by: ANESTHESIOLOGY

## 2024-06-26 PROCEDURE — 86850 RBC ANTIBODY SCREEN: CPT

## 2024-06-26 PROCEDURE — 86900 BLOOD TYPING SEROLOGIC ABO: CPT

## 2024-06-26 PROCEDURE — 6360000002 HC RX W HCPCS: Performed by: ANESTHESIOLOGY

## 2024-06-26 PROCEDURE — 6370000000 HC RX 637 (ALT 250 FOR IP): Performed by: ORTHOPAEDIC SURGERY

## 2024-06-26 PROCEDURE — C1713 ANCHOR/SCREW BN/BN,TIS/BN: HCPCS | Performed by: ORTHOPAEDIC SURGERY

## 2024-06-26 PROCEDURE — 3700000001 HC ADD 15 MINUTES (ANESTHESIA): Performed by: ORTHOPAEDIC SURGERY

## 2024-06-26 PROCEDURE — 3700000000 HC ANESTHESIA ATTENDED CARE: Performed by: ORTHOPAEDIC SURGERY

## 2024-06-26 PROCEDURE — 2720000010 HC SURG SUPPLY STERILE: Performed by: ORTHOPAEDIC SURGERY

## 2024-06-26 PROCEDURE — 86901 BLOOD TYPING SEROLOGIC RH(D): CPT

## 2024-06-26 PROCEDURE — 6360000002 HC RX W HCPCS

## 2024-06-26 PROCEDURE — 7100000000 HC PACU RECOVERY - FIRST 15 MIN: Performed by: ORTHOPAEDIC SURGERY

## 2024-06-26 PROCEDURE — 72020 X-RAY EXAM OF SPINE 1 VIEW: CPT

## 2024-06-26 DEVICE — BIO DBM PUTTY
Type: IMPLANTABLE DEVICE | Site: SPINE CERVICAL | Status: FUNCTIONAL
Brand: BIO DBM

## 2024-06-26 DEVICE — ANTERIOR CERVICAL CAGE
Type: IMPLANTABLE DEVICE | Site: SPINE CERVICAL | Status: FUNCTIONAL
Brand: TRITANIUM C

## 2024-06-26 DEVICE — IMPLANTABLE DEVICE: Type: IMPLANTABLE DEVICE | Site: SPINE CERVICAL | Status: FUNCTIONAL

## 2024-06-26 DEVICE — GRAFT BNE XS: Type: IMPLANTABLE DEVICE | Site: SPINE CERVICAL | Status: FUNCTIONAL

## 2024-06-26 DEVICE — SCREW SPNL L14MM DIA4MM SELF STARTING VAR ANT CERV TI OZARK: Type: IMPLANTABLE DEVICE | Site: SPINE CERVICAL | Status: FUNCTIONAL

## 2024-06-26 RX ORDER — ESMOLOL HYDROCHLORIDE 10 MG/ML
INJECTION INTRAVENOUS PRN
Status: DISCONTINUED | OUTPATIENT
Start: 2024-06-26 | End: 2024-06-26 | Stop reason: SDUPTHER

## 2024-06-26 RX ORDER — HYDROMORPHONE HYDROCHLORIDE 2 MG/ML
0.5 INJECTION, SOLUTION INTRAMUSCULAR; INTRAVENOUS; SUBCUTANEOUS EVERY 5 MIN PRN
Status: DISCONTINUED | OUTPATIENT
Start: 2024-06-26 | End: 2024-06-26 | Stop reason: HOSPADM

## 2024-06-26 RX ORDER — SODIUM CHLORIDE 0.9 % (FLUSH) 0.9 %
5-40 SYRINGE (ML) INJECTION PRN
Status: DISCONTINUED | OUTPATIENT
Start: 2024-06-26 | End: 2024-06-26 | Stop reason: HOSPADM

## 2024-06-26 RX ORDER — OXYCODONE HYDROCHLORIDE 5 MG/1
5 TABLET ORAL
Status: DISCONTINUED | OUTPATIENT
Start: 2024-06-26 | End: 2024-06-26 | Stop reason: HOSPADM

## 2024-06-26 RX ORDER — OXYCODONE HYDROCHLORIDE 5 MG/1
5 TABLET ORAL EVERY 6 HOURS PRN
Qty: 28 TABLET | Refills: 0 | Status: SHIPPED | OUTPATIENT
Start: 2024-06-26 | End: 2024-07-03

## 2024-06-26 RX ORDER — NEOSTIGMINE METHYLSULFATE 1 MG/ML
INJECTION, SOLUTION INTRAVENOUS PRN
Status: DISCONTINUED | OUTPATIENT
Start: 2024-06-26 | End: 2024-06-26 | Stop reason: SDUPTHER

## 2024-06-26 RX ORDER — ACETAMINOPHEN 500 MG
1000 TABLET ORAL ONCE
Status: DISCONTINUED | OUTPATIENT
Start: 2024-06-26 | End: 2024-06-26 | Stop reason: SDUPTHER

## 2024-06-26 RX ORDER — ACETAMINOPHEN 500 MG
1000 TABLET ORAL ONCE
Status: COMPLETED | OUTPATIENT
Start: 2024-06-26 | End: 2024-06-26

## 2024-06-26 RX ORDER — NALOXONE HYDROCHLORIDE 0.4 MG/ML
INJECTION, SOLUTION INTRAMUSCULAR; INTRAVENOUS; SUBCUTANEOUS PRN
Status: DISCONTINUED | OUTPATIENT
Start: 2024-06-26 | End: 2024-06-26 | Stop reason: HOSPADM

## 2024-06-26 RX ORDER — GLYCOPYRROLATE 0.2 MG/ML
INJECTION INTRAMUSCULAR; INTRAVENOUS PRN
Status: DISCONTINUED | OUTPATIENT
Start: 2024-06-26 | End: 2024-06-26 | Stop reason: SDUPTHER

## 2024-06-26 RX ORDER — LABETALOL HYDROCHLORIDE 5 MG/ML
10 INJECTION, SOLUTION INTRAVENOUS ONCE
Status: COMPLETED | OUTPATIENT
Start: 2024-06-26 | End: 2024-06-26

## 2024-06-26 RX ORDER — MIDAZOLAM HYDROCHLORIDE 2 MG/2ML
2 INJECTION, SOLUTION INTRAMUSCULAR; INTRAVENOUS
Status: DISCONTINUED | OUTPATIENT
Start: 2024-06-26 | End: 2024-06-26 | Stop reason: HOSPADM

## 2024-06-26 RX ORDER — SODIUM CHLORIDE, SODIUM LACTATE, POTASSIUM CHLORIDE, CALCIUM CHLORIDE 600; 310; 30; 20 MG/100ML; MG/100ML; MG/100ML; MG/100ML
INJECTION, SOLUTION INTRAVENOUS CONTINUOUS
Status: DISCONTINUED | OUTPATIENT
Start: 2024-06-26 | End: 2024-06-26 | Stop reason: HOSPADM

## 2024-06-26 RX ORDER — DEXAMETHASONE SODIUM PHOSPHATE 10 MG/ML
INJECTION INTRAMUSCULAR; INTRAVENOUS PRN
Status: DISCONTINUED | OUTPATIENT
Start: 2024-06-26 | End: 2024-06-26 | Stop reason: SDUPTHER

## 2024-06-26 RX ORDER — TRANEXAMIC ACID 100 MG/ML
INJECTION, SOLUTION INTRAVENOUS PRN
Status: DISCONTINUED | OUTPATIENT
Start: 2024-06-26 | End: 2024-06-26 | Stop reason: SDUPTHER

## 2024-06-26 RX ORDER — MIDAZOLAM HYDROCHLORIDE 1 MG/ML
INJECTION INTRAMUSCULAR; INTRAVENOUS PRN
Status: DISCONTINUED | OUTPATIENT
Start: 2024-06-26 | End: 2024-06-26 | Stop reason: SDUPTHER

## 2024-06-26 RX ORDER — HALOPERIDOL 5 MG/ML
1 INJECTION INTRAMUSCULAR
Status: COMPLETED | OUTPATIENT
Start: 2024-06-26 | End: 2024-06-26

## 2024-06-26 RX ORDER — DEXMEDETOMIDINE HYDROCHLORIDE 100 UG/ML
INJECTION, SOLUTION INTRAVENOUS PRN
Status: DISCONTINUED | OUTPATIENT
Start: 2024-06-26 | End: 2024-06-26 | Stop reason: SDUPTHER

## 2024-06-26 RX ORDER — SODIUM CHLORIDE 9 MG/ML
INJECTION, SOLUTION INTRAVENOUS PRN
Status: DISCONTINUED | OUTPATIENT
Start: 2024-06-26 | End: 2024-06-26 | Stop reason: HOSPADM

## 2024-06-26 RX ORDER — ONDANSETRON 2 MG/ML
INJECTION INTRAMUSCULAR; INTRAVENOUS PRN
Status: DISCONTINUED | OUTPATIENT
Start: 2024-06-26 | End: 2024-06-26 | Stop reason: SDUPTHER

## 2024-06-26 RX ORDER — SCOLOPAMINE TRANSDERMAL SYSTEM 1 MG/1
1 PATCH, EXTENDED RELEASE TRANSDERMAL ONCE
Status: DISCONTINUED | OUTPATIENT
Start: 2024-06-26 | End: 2024-06-26 | Stop reason: HOSPADM

## 2024-06-26 RX ORDER — FENTANYL CITRATE 50 UG/ML
100 INJECTION, SOLUTION INTRAMUSCULAR; INTRAVENOUS
Status: DISCONTINUED | OUTPATIENT
Start: 2024-06-26 | End: 2024-06-26 | Stop reason: HOSPADM

## 2024-06-26 RX ORDER — LIDOCAINE HYDROCHLORIDE 10 MG/ML
1 INJECTION, SOLUTION INFILTRATION; PERINEURAL
Status: DISCONTINUED | OUTPATIENT
Start: 2024-06-26 | End: 2024-06-26 | Stop reason: HOSPADM

## 2024-06-26 RX ORDER — HYDROMORPHONE HYDROCHLORIDE 2 MG/ML
INJECTION, SOLUTION INTRAMUSCULAR; INTRAVENOUS; SUBCUTANEOUS PRN
Status: DISCONTINUED | OUTPATIENT
Start: 2024-06-26 | End: 2024-06-26 | Stop reason: SDUPTHER

## 2024-06-26 RX ORDER — PROCHLORPERAZINE EDISYLATE 5 MG/ML
5 INJECTION INTRAMUSCULAR; INTRAVENOUS
Status: DISCONTINUED | OUTPATIENT
Start: 2024-06-26 | End: 2024-06-26 | Stop reason: HOSPADM

## 2024-06-26 RX ORDER — PROPOFOL 10 MG/ML
INJECTION, EMULSION INTRAVENOUS PRN
Status: DISCONTINUED | OUTPATIENT
Start: 2024-06-26 | End: 2024-06-26 | Stop reason: SDUPTHER

## 2024-06-26 RX ORDER — ONDANSETRON 2 MG/ML
4 INJECTION INTRAMUSCULAR; INTRAVENOUS
Status: DISCONTINUED | OUTPATIENT
Start: 2024-06-26 | End: 2024-06-26 | Stop reason: HOSPADM

## 2024-06-26 RX ORDER — KETAMINE HYDROCHLORIDE 50 MG/ML
INJECTION, SOLUTION INTRAMUSCULAR; INTRAVENOUS PRN
Status: DISCONTINUED | OUTPATIENT
Start: 2024-06-26 | End: 2024-06-26 | Stop reason: SDUPTHER

## 2024-06-26 RX ORDER — ROCURONIUM BROMIDE 10 MG/ML
INJECTION, SOLUTION INTRAVENOUS PRN
Status: DISCONTINUED | OUTPATIENT
Start: 2024-06-26 | End: 2024-06-26 | Stop reason: SDUPTHER

## 2024-06-26 RX ORDER — LIDOCAINE HYDROCHLORIDE 20 MG/ML
INJECTION, SOLUTION EPIDURAL; INFILTRATION; INTRACAUDAL; PERINEURAL PRN
Status: DISCONTINUED | OUTPATIENT
Start: 2024-06-26 | End: 2024-06-26 | Stop reason: SDUPTHER

## 2024-06-26 RX ORDER — SODIUM CHLORIDE 0.9 % (FLUSH) 0.9 %
5-40 SYRINGE (ML) INJECTION EVERY 12 HOURS SCHEDULED
Status: DISCONTINUED | OUTPATIENT
Start: 2024-06-26 | End: 2024-06-26 | Stop reason: HOSPADM

## 2024-06-26 RX ADMIN — HALOPERIDOL LACTATE 1 MG: 5 INJECTION, SOLUTION INTRAMUSCULAR at 09:32

## 2024-06-26 RX ADMIN — PROPOFOL 150 MG: 10 INJECTION, EMULSION INTRAVENOUS at 07:06

## 2024-06-26 RX ADMIN — TRANEXAMIC ACID 1000 MG: 100 INJECTION, SOLUTION INTRAVENOUS at 07:13

## 2024-06-26 RX ADMIN — HYDROMORPHONE HYDROCHLORIDE 0.4 MG: 2 INJECTION INTRAMUSCULAR; INTRAVENOUS; SUBCUTANEOUS at 07:31

## 2024-06-26 RX ADMIN — HYDROMORPHONE HYDROCHLORIDE 0.4 MG: 2 INJECTION INTRAMUSCULAR; INTRAVENOUS; SUBCUTANEOUS at 07:22

## 2024-06-26 RX ADMIN — DEXMEDETOMIDINE 8 MCG: 100 INJECTION, SOLUTION, CONCENTRATE INTRAVENOUS at 07:28

## 2024-06-26 RX ADMIN — ESMOLOL HYDROCHLORIDE 30 MG: 10 INJECTION, SOLUTION INTRAVENOUS at 08:16

## 2024-06-26 RX ADMIN — PROPOFOL 50 MG: 10 INJECTION, EMULSION INTRAVENOUS at 07:31

## 2024-06-26 RX ADMIN — ONDANSETRON 4 MG: 2 INJECTION INTRAMUSCULAR; INTRAVENOUS at 07:15

## 2024-06-26 RX ADMIN — DEXMEDETOMIDINE 8 MCG: 100 INJECTION, SOLUTION, CONCENTRATE INTRAVENOUS at 07:37

## 2024-06-26 RX ADMIN — PROPOFOL 50 MG: 10 INJECTION, EMULSION INTRAVENOUS at 07:28

## 2024-06-26 RX ADMIN — SODIUM CHLORIDE, SODIUM LACTATE, POTASSIUM CHLORIDE, AND CALCIUM CHLORIDE: 600; 310; 30; 20 INJECTION, SOLUTION INTRAVENOUS at 05:45

## 2024-06-26 RX ADMIN — ROCURONIUM BROMIDE 35 MG: 10 INJECTION, SOLUTION INTRAVENOUS at 07:07

## 2024-06-26 RX ADMIN — DEXAMETHASONE SODIUM PHOSPHATE 10 MG: 10 INJECTION INTRAMUSCULAR; INTRAVENOUS at 07:15

## 2024-06-26 RX ADMIN — LABETALOL HYDROCHLORIDE 10 MG: 5 INJECTION INTRAVENOUS at 09:05

## 2024-06-26 RX ADMIN — GLYCOPYRROLATE 0.4 MG: 0.2 INJECTION INTRAMUSCULAR; INTRAVENOUS at 08:18

## 2024-06-26 RX ADMIN — TRANEXAMIC ACID 1000 MG: 100 INJECTION, SOLUTION INTRAVENOUS at 08:12

## 2024-06-26 RX ADMIN — KETAMINE HYDROCHLORIDE 20 MG: 50 INJECTION, SOLUTION INTRAMUSCULAR; INTRAVENOUS at 07:06

## 2024-06-26 RX ADMIN — DEXMEDETOMIDINE 4 MCG: 100 INJECTION, SOLUTION, CONCENTRATE INTRAVENOUS at 07:23

## 2024-06-26 RX ADMIN — Medication 3 AMPULE: at 05:44

## 2024-06-26 RX ADMIN — ACETAMINOPHEN 1000 MG: 500 TABLET, FILM COATED ORAL at 05:35

## 2024-06-26 RX ADMIN — LIDOCAINE HYDROCHLORIDE 60 MG: 20 INJECTION, SOLUTION EPIDURAL; INFILTRATION; INTRACAUDAL; PERINEURAL at 07:06

## 2024-06-26 RX ADMIN — PROPOFOL 30 MG: 10 INJECTION, EMULSION INTRAVENOUS at 08:18

## 2024-06-26 RX ADMIN — PROPOFOL 50 MG: 10 INJECTION, EMULSION INTRAVENOUS at 07:47

## 2024-06-26 RX ADMIN — MIDAZOLAM 1 MG: 1 INJECTION INTRAMUSCULAR; INTRAVENOUS at 07:02

## 2024-06-26 RX ADMIN — HYDROMORPHONE HYDROCHLORIDE 0.6 MG: 2 INJECTION INTRAMUSCULAR; INTRAVENOUS; SUBCUTANEOUS at 07:06

## 2024-06-26 RX ADMIN — MIDAZOLAM 1 MG: 1 INJECTION INTRAMUSCULAR; INTRAVENOUS at 06:58

## 2024-06-26 RX ADMIN — Medication 2000 MG: at 07:13

## 2024-06-26 RX ADMIN — SODIUM CHLORIDE, SODIUM LACTATE, POTASSIUM CHLORIDE, AND CALCIUM CHLORIDE: 600; 310; 30; 20 INJECTION, SOLUTION INTRAVENOUS at 06:58

## 2024-06-26 RX ADMIN — HYDROMORPHONE HYDROCHLORIDE 0.6 MG: 2 INJECTION INTRAMUSCULAR; INTRAVENOUS; SUBCUTANEOUS at 07:47

## 2024-06-26 RX ADMIN — Medication 3 MG: at 08:18

## 2024-06-26 ASSESSMENT — PAIN - FUNCTIONAL ASSESSMENT: PAIN_FUNCTIONAL_ASSESSMENT: 0-10

## 2024-06-26 ASSESSMENT — PAIN SCALES - GENERAL: PAINLEVEL_OUTOF10: 0

## 2024-06-26 NOTE — ANESTHESIA PRE PROCEDURE
scopolamine (TRANSDERM-SCOP) transdermal patch 1 patch  1 patch TransDERmal Once Mendoza Little MD   1 patch at 06/26/24 0536   • lactated ringers IV soln infusion   IntraVENous Continuous Mendoza Little  mL/hr at 06/26/24 0616 NoRateChange at 06/26/24 0616   • sodium chloride flush 0.9 % injection 5-40 mL  5-40 mL IntraVENous 2 times per day Mendoza Little MD       • sodium chloride flush 0.9 % injection 5-40 mL  5-40 mL IntraVENous PRN Mendoza Little MD       • 0.9 % sodium chloride infusion   IntraVENous PRN Mendoza Little MD       • midazolam PF (VERSED) injection 2 mg  2 mg IntraVENous Once PRN Mendoza Little MD       • ceFAZolin (ANCEF) 2000 mg in sterile water 20 mL IV syringe  2,000 mg IntraVENous On Call to OR Danny Rosa MD           Allergies:    Allergies   Allergen Reactions   • Penicillins Rash     As child         Problem List:    Patient Active Problem List   Diagnosis Code   • Acquired hypothyroidism E03.9   • Carpal tunnel syndrome G56.00   • Essential hypertension I10   • Fatty liver K76.0   • Hyperlipidemia E78.5   • Macular degeneration of both eyes H35.30   • Paresthesia and pain of both upper extremities R20.2, M79.601, M79.602   • Muscle weakness (generalized) M62.81   • Neck pain on left side M54.2   • Decreased activities of daily living (ADL) Z78.9   • Decreased activity tolerance R68.89   • Chronic left shoulder pain M25.512, G89.29   • Shoulder stiffness, left M25.612   • Cervical spinal stenosis [M48.02] M48.02   • Cervical radiculopathy M54.12   • Cervical kyphosis M40.202       Past Medical History:        Diagnosis Date   • Hyperlipidemia    • Hypertension    • Thyroid disease        Past Surgical History:        Procedure Laterality Date   • CHOLECYSTECTOMY, LAPAROSCOPIC  2011   • HYSTERECTOMY (CERVIX STATUS UNKNOWN)  2011    Endometeriosis   • ORIF HUMERUS FRACTURE  2010    with rai inserted       Social History:    Social History     Tobacco Use   •

## 2024-06-26 NOTE — ANESTHESIA POSTPROCEDURE EVALUATION
Department of Anesthesiology  Postprocedure Note    Patient: China Santos  MRN: [id number]  YOB: 1960  Date of evaluation: 6/26/2024    Procedure Summary       Date: 06/26/24 Room / Location: [facility] MAIN OR [address] [place] [address] MAIN OR    Anesthesia Start: 0658 Anesthesia Stop: 0852    Procedure: C4-6 anterior cervical discectomy and fusion with interbody spacer, allograft and instrumentation. (Spine Cervical) Diagnosis:       Cervical spinal stenosis      Cervical radiculopathy      Cervical kyphosis      (Cervical spinal stenosis [M48.02])      (Cervical radiculopathy [M54.12])      (Cervical kyphosis [M40.202])    Providers: Danny Rosa MD Responsible Provider: Mendoza Little MD    Anesthesia Type: general ASA Status: 2            Anesthesia Type: No value filed.    Emely Phase I: Emely Score: 9    Emely Phase II:      Anesthesia Post Evaluation    Patient location during evaluation: PACU  Patient participation: complete - patient participated  Level of consciousness: awake and alert  Pain score: 2  Airway patency: patent  Nausea & Vomiting: no nausea  Cardiovascular status: blood pressure returned to baseline and hemodynamically stable  Respiratory status: acceptable  Hydration status: euvolemic  Multimodal analgesia pain management approach  Pain management: adequate and satisfactory to patient    No notable events documented.

## 2024-06-27 ENCOUNTER — TELEPHONE (OUTPATIENT)
Dept: ORTHOPEDIC SURGERY | Age: 64
End: 2024-06-27

## 2024-06-27 NOTE — TELEPHONE ENCOUNTER
She had surgery yesterday. She wants to speak to Dionne. She has a couple of questions. She wants to know how long she has to wear the compressions socks and she wants to discuss her medication.

## 2024-06-27 NOTE — TELEPHONE ENCOUNTER
Discussed that she can stop the compression stockings once she is back to being active and normal activity. She wanted to make sure that it was ok to decrease her medication. Informed her that was fine and to call back with any other questions

## 2024-06-28 NOTE — TELEPHONE ENCOUNTER
Appointment changed. Home health was offered, but only if she thinks she needs it. We offer this to all post operative patients. She does not have to accept home health and certainly can turn down services, if she feels it is not needed. Reached voice mail and left message

## 2024-07-05 ENCOUNTER — TELEPHONE (OUTPATIENT)
Dept: ORTHOPEDIC SURGERY | Age: 64
End: 2024-07-05

## 2024-07-05 NOTE — TELEPHONE ENCOUNTER
She is unclear on changing her bandage. The end of the bandage is coming off. She is asking for a return call.

## 2024-07-05 NOTE — TELEPHONE ENCOUNTER
Spoke with patient who reports no drainage or redness at incision site.  The glue is still on the site but is peeling.  I informed her that this was normal and it will either come off on it's own or CDV team will remove it at her post op appointment.

## 2024-07-15 ENCOUNTER — OFFICE VISIT (OUTPATIENT)
Age: 64
End: 2024-07-15

## 2024-07-15 DIAGNOSIS — Z98.1 STATUS POST CERVICAL SPINAL ARTHRODESIS: Primary | ICD-10-CM

## 2024-07-15 PROCEDURE — 99024 POSTOP FOLLOW-UP VISIT: CPT | Performed by: ORTHOPAEDIC SURGERY

## 2024-07-15 NOTE — PROGRESS NOTES
Name: China Santos  YOB: 1960  Gender: female  MRN: [id number]  Age: 64 y.o.    Chief Complaint: Neck surgery follow up.    History of present illness:    China Santos is here for 3 week follow up of her  anterior cervical discectomy and fusion surgery. She reports significant relief of preoperative upper extremity pain, weakness and parasthesias.  The wound is benign.     Medications:   Current Outpatient Medications   Medication Sig    b complex vitamins capsule Take 1 capsule by mouth daily    Omega-3 Fatty Acids (FISH OIL) 1200 MG CPDR Take 2 capsules by mouth Daily    Multiple Vitamins-Minerals (PRESERVISION AREDS 2) CAPS Take 1 capsule by mouth in the morning and at bedtime    gabapentin (NEURONTIN) 100 MG capsule Take 1 capsule by mouth 3 times daily for 30 days. Intended supply: 30 days    ALPRAZolam (XANAX) 1 MG tablet Take 1 tablet by mouth 2 times daily as needed.    SYNTHROID 112 MCG tablet     lisinopril (PRINIVIL;ZESTRIL) 5 MG tablet Take 1 tablet by mouth daily May take an additional dose if BP rises    mupirocin (BACTROBAN) 2 % ointment Apply topically daily as needed    rosuvastatin (CRESTOR) 5 MG tablet Take 1 tablet by mouth nightly     No current facility-administered medications for this visit.       Allergies:   Allergies   Allergen Reactions    Penicillins Rash     As child          Physical Exam:     Oriented to person, place and time.    Mood and affect are appropriate.    Respirations are unlabored and there is no evidence of cyanosis.    Wound is healed up nicely without erythema or underlying fluctuance.     Sensory testing reveals intact sensation to light touch.    Strength testing in the upper extremity reveals the following based on the 5 point grading scale:       Delt(C5) Bicep(C6) WE(C6) Tricep (C7) WF(C7) (C8) Int (T1)   Right 5 5 5 5 5 5 5   Left 5 5 5 5 5 5 5       Radiographic Studies:    Radiographs:   2 views cervical spine (AP and

## 2024-09-19 ENCOUNTER — OFFICE VISIT (OUTPATIENT)
Age: 64
End: 2024-09-19

## 2024-09-19 DIAGNOSIS — Z98.1 STATUS POST CERVICAL SPINAL ARTHRODESIS: Primary | ICD-10-CM

## 2024-09-19 PROCEDURE — 99024 POSTOP FOLLOW-UP VISIT: CPT | Performed by: ORTHOPAEDIC SURGERY

## (undated) DEVICE — SUTURE MONOCRYL STRATAFIX SPRL + SZ 4-0 L12IN ABSRB UD PS-2 SXMP1B117

## (undated) DEVICE — ACDF DR VANPELT & LUCAS: Brand: MEDLINE INDUSTRIES, INC.

## (undated) DEVICE — LIQUIBAND RAPID ADHESIVE 36/CS 0.8ML: Brand: MEDLINE

## (undated) DEVICE — FORCEPS BPLR L210MM TIP L1.5 WRK L105MM STR BAYNT INSUL DISP

## (undated) DEVICE — AGENT HEMOSTATIC SURGIFLOW MATRIX KIT W/THROMBIN

## (undated) DEVICE — DRAIN SURG 10FR END PERF 1/8IN SIL RND SMOOTH HEAT POLISHED

## (undated) DEVICE — BIT DRILL 14MM

## (undated) DEVICE — SOLUTION IRRIG 1000ML 0.9% SOD CHL USP POUR PLAS BTL

## (undated) DEVICE — SUTURE VICRYL + SZ 3-0 L18IN ABSRB UD PS-2 3/8 CIR REV CUT VCP497H

## (undated) DEVICE — SYSTEM SKIN CLSR 22CM DERMBND PRINEO